# Patient Record
Sex: FEMALE | Race: WHITE
[De-identification: names, ages, dates, MRNs, and addresses within clinical notes are randomized per-mention and may not be internally consistent; named-entity substitution may affect disease eponyms.]

---

## 2022-09-14 ENCOUNTER — HOSPITAL ENCOUNTER (EMERGENCY)
Dept: HOSPITAL 54 - ER | Age: 67
Discharge: HOME | End: 2022-09-14
Payer: COMMERCIAL

## 2022-09-14 VITALS — WEIGHT: 122 LBS | BODY MASS INDEX: 20.33 KG/M2 | HEIGHT: 65 IN

## 2022-09-14 VITALS — DIASTOLIC BLOOD PRESSURE: 74 MMHG | SYSTOLIC BLOOD PRESSURE: 169 MMHG

## 2022-09-14 DIAGNOSIS — J44.9: ICD-10-CM

## 2022-09-14 DIAGNOSIS — J32.9: Primary | ICD-10-CM

## 2022-09-14 DIAGNOSIS — Z79.899: ICD-10-CM

## 2022-09-14 NOTE — NUR
BIBS C/O SOB AND CONGESTION X 3 WEEKS. NO RELIEF WITH ALBUTEROL. O2 SATURATION 
100% RA BREATHING EVEN AND UNLABORED. ASSISTED TO ER BED 7 AND PLACED ON 
MONITOR AND PULSE OX. MD WAS AT BEDSIDE FOR EVAL.